# Patient Record
Sex: MALE | Race: WHITE | NOT HISPANIC OR LATINO | URBAN - METROPOLITAN AREA
[De-identification: names, ages, dates, MRNs, and addresses within clinical notes are randomized per-mention and may not be internally consistent; named-entity substitution may affect disease eponyms.]

---

## 2018-10-12 ENCOUNTER — APPOINTMENT (RX ONLY)
Dept: URBAN - METROPOLITAN AREA CLINIC 88 | Facility: CLINIC | Age: 83
Setting detail: DERMATOLOGY
End: 2018-10-12

## 2018-10-12 PROBLEM — C44.219 BASAL CELL CARCINOMA OF SKIN OF LEFT EAR AND EXTERNAL AURICULAR CANAL: Status: ACTIVE | Noted: 2018-10-12

## 2018-10-12 PROCEDURE — 99202 OFFICE O/P NEW SF 15 MIN: CPT

## 2018-10-12 PROCEDURE — ? OTHER

## 2018-10-12 NOTE — PROCEDURE: OTHER
Detail Level: Detailed
Other (Free Text): Pt likely needs Erivedge vs MOHS in [facility]. Need to discuss with family or POA rahman the options for treatment. Would be a good candidate for Erivedge given it would likely be an extensive surgery and repair process on what appears to be a poor surgical candidate.
Note Text (......Xxx Chief Complaint.): This diagnosis correlates with the